# Patient Record
(demographics unavailable — no encounter records)

---

## 2024-10-28 NOTE — PHYSICAL EXAM
[Acute Distress] : no acute distress [Alert] : alert [Symmetric Chest Wall] : symmetric chest wall [Tenderness With Palpation of Chest Wall] : no tenderness with palpation of chest wall [Clear to Auscultation Bilaterally] : clear to auscultation bilaterally [Regular Rate and Rhythm] : regular rate and rhythm [Normal S1, S2 audible] : normal S1, S2 audible [Murmur] : no murmur [Tachycardia] : no tachycardia [Soft] : soft [Tender] : nontender [Distended] : nondistended [Normal Bowel Sounds] : normal bowel sounds [Hepatosplenomegaly] : no hepatosplenomegaly [No Abnormal Lymph Nodes Palpated] : no abnormal lymph nodes palpated [Normotonic] : normotonic [NL] : warm, clear [Warm] : warm [Clear] : clear [FreeTextEntry1] : interactive [FreeTextEntry7] : aerating well, no cough no wheeze no distress [FreeTextEntry8] : no tachycardia, no irregular beats, hr 70-76 in office though he claims he feels it in office

## 2024-10-28 NOTE — HISTORY OF PRESENT ILLNESS
[de-identified] : Chest pain, heart is beting fast sometimes and Occasionally shortness of breath since this am no fever.  [FreeTextEntry6] : no new meds

## 2024-10-28 NOTE — REVIEW OF SYSTEMS
[Chest Pain] : chest pain [Negative] : Genitourinary [FreeTextEntry1] : intermoittent hear racing per pr

## 2024-10-28 NOTE — DISCUSSION/SUMMARY
[FreeTextEntry1] : 11 yr old with chest pain and h/o racing heart refer to ed now for eval, ekg refer to cardiology [] : The components of the vaccine(s) to be administered today are listed in the plan of care. The disease(s) for which the vaccine(s) are intended to prevent and the risks have been discussed with the caretaker.  The risks are also included in the appropriate vaccination information statements which have been provided to the patient's caregiver.  The caregiver has given consent to vaccinate.

## 2024-11-22 NOTE — REVIEW OF SYSTEMS
[Feeling Poorly] : not feeling poorly (malaise) [Fever] : no fever [Wgt Loss (___ Lbs)] : no recent weight loss [Pallor] : not pale [Eye Discharge] : no eye discharge [Redness] : no redness [Nasal Stuffiness] : no nasal congestion [Change in Vision] : no change in vision [Sore Throat] : no sore throat [Earache] : no earache [Loss Of Hearing] : no hearing loss [Cyanosis] : no cyanosis [Edema] : no edema [Diaphoresis] : not diaphoretic [Chest Pain] : chest pain  or discomfort [Exercise Intolerance] : no persistence of exercise intolerance [Palpitations] : palpitations [Orthopnea] : no orthopnea [Fast HR] : no tachycardia [Tachypnea] : not tachypneic [Wheezing] : no wheezing [Cough] : no cough [Shortness Of Breath] : not expressed as feeling short of breath [Vomiting] : no vomiting [Diarrhea] : no diarrhea [Abdominal Pain] : no abdominal pain [Decrease In Appetite] : appetite not decreased [Fainting (Syncope)] : no fainting [Seizure] : no seizures [Headache] : no headache [Dizziness] : no dizziness [Limping] : no limping [Joint Pains] : no arthralgias [Joint Swelling] : no joint swelling [Rash] : no rash [Wound problems] : no wound problems [Easy Bruising] : no tendency for easy bruising [Swollen Glands] : no lymphadenopathy [Easy Bleeding] : no ~M tendency for easy bleeding [Nosebleeds] : no epistaxis [Sleep Disturbances] : ~T no sleep disturbances [Hyperactive] : no hyperactive behavior [Depression] : no depression [Anxiety] : no anxiety [Failure To Thrive] : no failure to thrive [Short Stature] : short stature was not noted [Jitteriness] : no jitteriness [Heat/Cold Intolerance] : no temperature intolerance [Dec Urine Output] : no oliguria

## 2024-11-22 NOTE — CONSULT LETTER
[Today's Date] : [unfilled] [Name] : Name: [unfilled] [] : : ~~ [Today's Date:] : [unfilled] [Dear  ___:] : Dear Dr. [unfilled]: [Consult] : I had the pleasure of evaluating your patient, [unfilled]. My full evaluation follows. [Consult - Single Provider] : Thank you very much for allowing me to participate in the care of this patient. If you have any questions, please do not hesitate to contact me. [Sincerely,] : Sincerely, [FreeTextEntry4] : Behzad Talebian MD [FreeTextEntry5] : [address] [facility] [FreeTextEntry6] : [place] [id number] [de-identified] : Jus Lopez DO MPH Pediatric Cardiology [facility]

## 2024-11-22 NOTE — REASON FOR VISIT
[Initial Evaluation] : an initial evaluation of [Palpitations] : palpitations [Mother] : mother [Father] : father [Patient] : patient [Parents] : parents

## 2025-01-27 NOTE — CONSULT LETTER
[Today's Date] : [unfilled] [Name] : Name: [unfilled] [] : : ~~ [Today's Date:] : [unfilled] [Dear  ___:] : Dear Dr. [unfilled]: [Consult] : I had the pleasure of evaluating your patient, [unfilled]. My full evaluation follows. [Consult - Single Provider] : Thank you very much for allowing me to participate in the care of this patient. If you have any questions, please do not hesitate to contact me. [Sincerely,] : Sincerely, [FreeTextEntry4] : Behzad Talebian MD [FreeTextEntry5] : [address] [facility] [FreeTextEntry6] : [place] [id number] [de-identified] : Jus Lopez DO MPH Pediatric Cardiology [facility]

## 2025-01-27 NOTE — CARDIOLOGY SUMMARY
[LVSF ___%] : LV Shortening Fraction [unfilled]% [de-identified] : 1/24/25 [FreeTextEntry1] : Sinus bradycardia @ 53 bpm w/ sinus arrhythmia  WV: 156 ms QRS: 104 ms  QTc: 390 ms P-R-T Axis (-53) No ST segment abnormalities No pre-excitation  [de-identified] : 11/22/24 [FreeTextEntry2] :  A complete 2D, M-mode, doppler and color flow doppler transthoracic pediatric echocardiogram was performed. The intracardiac anatomy and doppler flow profiles were otherwise normal appearing with the following:  Summary: 1. 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 2. Trivial tricuspid valve regurgitation. 3. Trivial pulmonary valve regurgitation. 4. Physiologic mitral valve regurgitation. 5. Normal right ventricular morphology with qualitatively normal size and systolic function. 6. Normal left ventricular size, morphology and systolic function. 7. No pericardial effusion. [de-identified] : 12/9-12/16/24 [de-identified] : Patient had a min HR of 26 bpm, max HR of 187 bpm, and avg HR of 84 bpm. Predominant underlying rhythm was Sinus Rhythm. Second Degree AV Block-Mobitz I (Wenckebach) was present.   Isolated SVEs were rare (<1.0%, 123), and no SVE Couplets or SVE Triplets were present.   Isolated VEs were rare (<1.0%, 29), and no VE Couplets or VE Triplets were present.  No captured arrhythmia with patient triggered events.

## 2025-01-27 NOTE — HISTORY OF PRESENT ILLNESS
[FreeTextEntry1] : Vicki is a well appearing, active 11 year old who presents for a follow up evaluation of his chest pain, palpitations and review of his Holter monitor results.   Vicki presents doing well. Denies ongoing palpitations.  Prior episodes of palpitations captured during monitoring period. Parent suspects hyperawareness to normal heart rate variations.   Vicki previously described palpitations as a "faster than usual heart rate." Episodes had been times associated with being upset or anxious.  Vicki reports improvement in prior chest pain. Described as a localized, brief pinch at rest. No recent exacerbations. Not associated with exercise.   There has been no shortness of breath, dizziness, lightheadedness, syncope or near syncope.  There has been no history of exercise induced symptoms nor recent changes in exercise tolerance or activity levels. No chest pain, syncope or palpitations with exercise. Swims/dance without concerns.  There has been no  dizziness, lightheadedness, syncope or near syncope.  Good appetite, denies fasting. Could improve daily hydration; not at goal. Denies excessive caffeine intake.  No reported concerns with growth or development.  There has been no recent fevers, illnesses or hospitalizations.   Dad: Inverted T waves-denies cardiac pathology otherwise with extensive work up. Denies CAD.  Paternal 2nd Cousin: Complex CHD Paternal Aunt: "leaky heart valve" as an adult.  No additionally reported family history of an arrhythmia, aortic aneurysm, unexplained death, bicuspid aortic valve,  congenital heart disease, cardiomyopathy or sudden cardiac death, long QT syndrome, drowning or unexplained accidental death.

## 2025-01-27 NOTE — CARDIOLOGY SUMMARY
[LVSF ___%] : LV Shortening Fraction [unfilled]% [de-identified] : 1/24/25 [FreeTextEntry1] : Sinus bradycardia @ 53 bpm w/ sinus arrhythmia  CA: 156 ms QRS: 104 ms  QTc: 390 ms P-R-T Axis (-53) No ST segment abnormalities No pre-excitation  [de-identified] : 11/22/24 [FreeTextEntry2] :  A complete 2D, M-mode, doppler and color flow doppler transthoracic pediatric echocardiogram was performed. The intracardiac anatomy and doppler flow profiles were otherwise normal appearing with the following:  Summary: 1. 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 2. Trivial tricuspid valve regurgitation. 3. Trivial pulmonary valve regurgitation. 4. Physiologic mitral valve regurgitation. 5. Normal right ventricular morphology with qualitatively normal size and systolic function. 6. Normal left ventricular size, morphology and systolic function. 7. No pericardial effusion. [de-identified] : 12/9-12/16/24 [de-identified] : Patient had a min HR of 26 bpm, max HR of 187 bpm, and avg HR of 84 bpm. Predominant underlying rhythm was Sinus Rhythm. Second Degree AV Block-Mobitz I (Wenckebach) was present.   Isolated SVEs were rare (<1.0%, 123), and no SVE Couplets or SVE Triplets were present.   Isolated VEs were rare (<1.0%, 29), and no VE Couplets or VE Triplets were present.  No captured arrhythmia with patient triggered events.

## 2025-01-27 NOTE — CONSULT LETTER
[Today's Date] : [unfilled] [Name] : Name: [unfilled] [] : : ~~ [Today's Date:] : [unfilled] [Dear  ___:] : Dear Dr. [unfilled]: [Consult] : I had the pleasure of evaluating your patient, [unfilled]. My full evaluation follows. [Consult - Single Provider] : Thank you very much for allowing me to participate in the care of this patient. If you have any questions, please do not hesitate to contact me. [Sincerely,] : Sincerely, [FreeTextEntry4] : Behzad Talebian MD [FreeTextEntry5] : [address] [facility] [FreeTextEntry6] : [place] [id number] [de-identified] : Jus Lopez DO MPH Pediatric Cardiology [facility]

## 2025-01-27 NOTE — DISCUSSION/SUMMARY
[FreeTextEntry1] : LEIGH ANN  is a healthy, thriving 11 year old who presents without identified concerns for congestive heart failure nor impaired cardiac output by his  past medical history nor on his  current physical exam. his  EKG demonstrated a subtle abnormality while his echocardiogram was further reassuring.   - Previously with trivial tricuspid, pulmonary and physiologic mitral regurgitation in otherwise well functioning valves. This was not audible on physical exam and not hemodynamically significant at this time.   -Trivial vessel noted previously in the proximal left pulmonary artery; not readily audible on physical exam. Normal biventricular size and function. 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 Does not appear hemodynamically significant at this time. Will follow longitudinally.   -Holter monitor with rare PACs, rare PVCs not associated with patient triggers. No captured arrhythmia with triggered events. No recent exacerbations.   - I discussed at length with the family the causes of palpitations in children of this age group, which may represent an arrhythmia but also could simply represent an increased awareness of his  own heart beat (especially during periods of activity, dehydration, pain or anxiety, when a "stronger" heart beat may be noted). I recommended he  keep a journal of ongoing episodes and to contact our office should symptoms worsen or fail to improve. We discussed symptoms that should prompt medical attention immediately as well as reviewed symptoms of an arrhythmia.  -Improve daily hydration. Avoid caffeine.   -  We discussed the more common causes of chest pain in children, including musculoskeletal pain, pulmonary conditions such as asthma, and gastrointestinal conditions such as reflux. There was reported tenderness to palpation which LEIGH ANN  reported was clinically reproducible suggesting costochondritis. I am pleased that these episodes do not interfere with his  daily routine. We discussed stretching techniques and application of a warm compress. May try Ibuprofen as needed w/ meals ( monitor for gastritis, easy bruising/bleeding. We also discussed keeping a journal of any ongoing episodes for improved recognition of possible triggers and qualifying factors.     I recommended 1 year follow up and we reviewed signs and symptoms that should prompt medical attention and sooner evaluation. Above information explained in detail with assistance of a colored diagram.  LEIGH ANN and family verbalized their understanding and all questions were answered.    [Needs SBE Prophylaxis] : [unfilled] does not need bacterial endocarditis prophylaxis [May participate in all age-appropriate activities] : [unfilled] May participate in all age-appropriate activities.